# Patient Record
Sex: MALE | Race: WHITE | Employment: OTHER | URBAN - METROPOLITAN AREA
[De-identification: names, ages, dates, MRNs, and addresses within clinical notes are randomized per-mention and may not be internally consistent; named-entity substitution may affect disease eponyms.]

---

## 2019-10-30 ENCOUNTER — HOSPITAL ENCOUNTER (EMERGENCY)
Age: 64
Discharge: HOME | End: 2019-10-30
Attending: EMERGENCY MEDICINE | Admitting: EMERGENCY MEDICINE
Payer: SELF-PAY

## 2019-10-30 VITALS
TEMPERATURE: 98.7 F | RESPIRATION RATE: 16 BRPM | HEART RATE: 108 BPM | DIASTOLIC BLOOD PRESSURE: 93 MMHG | OXYGEN SATURATION: 97 % | SYSTOLIC BLOOD PRESSURE: 138 MMHG

## 2019-10-30 DIAGNOSIS — S01.01XA LACERATION OF SCALP, INITIAL ENCOUNTER: Primary | ICD-10-CM

## 2019-10-30 DIAGNOSIS — S21.111A LACERATION OF CHEST WALL, RIGHT, INITIAL ENCOUNTER: ICD-10-CM

## 2019-10-30 PROCEDURE — 74011000250 HC RX REV CODE- 250: Performed by: EMERGENCY MEDICINE

## 2019-10-30 PROCEDURE — 90715 TDAP VACCINE 7 YRS/> IM: CPT | Performed by: EMERGENCY MEDICINE

## 2019-10-30 PROCEDURE — 99283 EMERGENCY DEPT VISIT LOW MDM: CPT

## 2019-10-30 PROCEDURE — 74011250636 HC RX REV CODE- 250/636: Performed by: EMERGENCY MEDICINE

## 2019-10-30 PROCEDURE — 75810000294 HC INTERM/LAYERED WND RPR

## 2019-10-30 PROCEDURE — 75810000293 HC SIMP/SUPERF WND  RPR

## 2019-10-30 PROCEDURE — 96372 THER/PROPH/DIAG INJ SC/IM: CPT

## 2019-10-30 PROCEDURE — 90471 IMMUNIZATION ADMIN: CPT

## 2019-10-30 PROCEDURE — 74011000250 HC RX REV CODE- 250

## 2019-10-30 RX ORDER — LIDOCAINE HYDROCHLORIDE AND EPINEPHRINE 20; 10 MG/ML; UG/ML
15 INJECTION, SOLUTION INFILTRATION; PERINEURAL
Status: COMPLETED | OUTPATIENT
Start: 2019-10-30 | End: 2019-10-30

## 2019-10-30 RX ORDER — LIDOCAINE HYDROCHLORIDE AND EPINEPHRINE 20; 10 MG/ML; UG/ML
INJECTION, SOLUTION INFILTRATION; PERINEURAL
Status: COMPLETED
Start: 2019-10-30 | End: 2019-10-30

## 2019-10-30 RX ORDER — CEPHALEXIN 500 MG/1
500 CAPSULE ORAL 2 TIMES DAILY
Qty: 6 CAP | Refills: 0 | Status: SHIPPED | OUTPATIENT
Start: 2019-10-30 | End: 2019-11-02

## 2019-10-30 RX ADMIN — TETANUS TOXOID, REDUCED DIPHTHERIA TOXOID AND ACELLULAR PERTUSSIS VACCINE, ADSORBED 0.5 ML: 5; 2.5; 8; 8; 2.5 SUSPENSION INTRAMUSCULAR at 22:11

## 2019-10-30 RX ADMIN — LIDOCAINE HYDROCHLORIDE AND EPINEPHRINE 300 MG: 20; 10 INJECTION, SOLUTION INFILTRATION; PERINEURAL at 23:23

## 2019-10-30 RX ADMIN — CEFAZOLIN 1000 MG: 1 INJECTION, POWDER, FOR SOLUTION INTRAMUSCULAR; INTRAVENOUS; PARENTERAL at 23:24

## 2019-10-30 RX ADMIN — LIDOCAINE HYDROCHLORIDE,EPINEPHRINE BITARTRATE 300 MG: 20; .01 INJECTION, SOLUTION INFILTRATION; PERINEURAL at 23:23

## 2019-10-31 NOTE — DISCHARGE INSTRUCTIONS
Patient Education        Cuts Closed With Stitches: Care Instructions  Your Care Instructions  A cut can happen anywhere on your body. The doctor used stitches to close the cut. Using stitches also helps the cut heal and reduces scarring. Sometimes pieces of tape called Steri-Strips are put over the stitches. If the cut went deep and through the skin, the doctor may have put in two layers of stitches. The deeper layer brings the deep part of the cut together. These stitches will dissolve and don't need to be removed. The stitches in the upper layer are the ones you see on the cut. You will probably have a bandage over the stitches. You will need to have the stitches removed, usually in 7 to 14 days. The doctor has checked you carefully, but problems can develop later. If you notice any problems or new symptoms, get medical treatment right away. Follow-up care is a key part of your treatment and safety. Be sure to make and go to all appointments, and call your doctor if you are having problems. It's also a good idea to know your test results and keep a list of the medicines you take. How can you care for yourself at home? · Keep the cut dry for the first 24 to 48 hours. After this, you can shower if your doctor okays it. Pat the cut dry. · Don't soak the cut, such as in a bathtub. Your doctor will tell you when it's safe to get the cut wet. · If your doctor told you how to care for your cut, follow your doctor's instructions. If you did not get instructions, follow this general advice:  ? After the first 24 to 48 hours, wash around the cut with clean water 2 times a day. Don't use hydrogen peroxide or alcohol, which can slow healing. ? You may cover the cut with a thin layer of petroleum jelly, such as Vaseline, and a nonstick bandage. ? Apply more petroleum jelly and replace the bandage as needed. · Prop up the sore area on a pillow anytime you sit or lie down during the next 3 days.  Try to keep it above the level of your heart. This will help reduce swelling. · Avoid any activity that could cause your cut to reopen. · Do not remove the stitches on your own. Your doctor will tell you when to come back to have the stitches removed. · Leave Steri-Strips on until they fall off. · Be safe with medicines. Read and follow all instructions on the label. ? If the doctor gave you a prescription medicine for pain, take it as prescribed. ? If you are not taking a prescription pain medicine, ask your doctor if you can take an over-the-counter medicine. When should you call for help? Call your doctor now or seek immediate medical care if:    · You have new pain, or your pain gets worse.     · The skin near the cut is cold or pale or changes color.     · You have tingling, weakness, or numbness near the cut.     · The cut starts to bleed, and blood soaks through the bandage. Oozing small amounts of blood is normal.     · You have trouble moving the area near the cut.     · You have symptoms of infection, such as:  ? Increased pain, swelling, warmth, or redness around the cut.  ? Red streaks leading from the cut.  ? Pus draining from the cut.  ? A fever.    Watch closely for changes in your health, and be sure to contact your doctor if:    · The cut reopens.     · You do not get better as expected. Where can you learn more? Go to http://ledy-dejuan.info/. Enter R217 in the search box to learn more about \"Cuts Closed With Stitches: Care Instructions. \"  Current as of: June 26, 2019  Content Version: 12.2  © 5169-0549 [employer]. Care instructions adapted under license by [employer] (which disclaims liability or warranty for this information). If you have questions about a medical condition or this instruction, always ask your healthcare professional. [address] any warranty or liability for your use of this information.

## 2019-10-31 NOTE — ED NOTES
Patient provided with discharge instruction and reviewed suture care instructions provided. Patient ambulated independently with a steady gait with family member. Suture sites were intact and bandaged.  Patient was A&O x4

## 2019-10-31 NOTE — ED NOTES
Arrived to ED from home with family. Patient stated he tripped over rug while holding a glass and fell on the glass while drinking  ETOH. Lacerations about 3 inches on the right chest and mid anterior on head.  Denies numbness or tingling in right arm

## 2019-11-08 ENCOUNTER — HOSPITAL ENCOUNTER (EMERGENCY)
Age: 64
Discharge: HOME | End: 2019-11-08
Attending: EMERGENCY MEDICINE | Admitting: EMERGENCY MEDICINE
Payer: MEDICARE

## 2019-11-08 VITALS
DIASTOLIC BLOOD PRESSURE: 88 MMHG | TEMPERATURE: 98.1 F | OXYGEN SATURATION: 99 % | RESPIRATION RATE: 14 BRPM | HEART RATE: 95 BPM | SYSTOLIC BLOOD PRESSURE: 160 MMHG

## 2019-11-08 DIAGNOSIS — Z48.02 VISIT FOR SUTURE REMOVAL: Primary | ICD-10-CM

## 2019-11-08 PROCEDURE — 75810000275 HC EMERGENCY DEPT VISIT NO LEVEL OF CARE

## 2019-11-08 PROCEDURE — 74011000250 HC RX REV CODE- 250: Performed by: EMERGENCY MEDICINE

## 2019-11-08 RX ORDER — BACITRACIN 500 UNIT/G
1 PACKET (EA) TOPICAL ONCE
Status: COMPLETED | OUTPATIENT
Start: 2019-11-08 | End: 2019-11-08

## 2019-11-08 RX ADMIN — BACITRACIN 1 PACKET: 500 OINTMENT TOPICAL at 12:16

## 2019-11-08 NOTE — DISCHARGE INSTRUCTIONS
Patient Education        Learning About Stitches and Staples Removal  When are stitches and staples removed? Your doctor will tell you when to have your stitches or staples removed, usually in 7 to 14 days. How long you'll be told to wait will depend on things like where the wound is located, how big and how deep the wound is, and what your general health is like. Do not remove the stitches on your own. Stitches on the face are usually removed within a week. But stitches and staples on other areas of the body, such as on the back or belly or over a joint, may need to stay in place longer, often a week or two. Be sure to follow your doctor's instructions. How are stitches and staples removed? It usually doesn't hurt when the doctor removes the stitches or staples. You may feel a tug as each stitch or staple is removed. · You will either be seated or lying down. · To remove stitches, the doctor will use scissors to cut each of the knots and then pull the threads out. · To remove staples, the doctor will use a tool to take out the staples one at a time. · The area may still feel tender after the stitches or staples are gone. But it should feel better within a few minutes or up to a few hours. What can you expect after stitches and staples are removed? Depending on the type and location of the cut, you will have a scar. Scars usually fade over time. Keep the area clean, but you won't need a bandage. When should you call for help? Call your doctor now or seek immediate medical care if :  · You have new pain, or your pain gets worse. · You have trouble moving the area near the scar. · You have symptoms of infection, such as:  ? Increased pain, swelling, warmth, or redness around the scar. ? Red streaks leading from the scar. ? Pus draining from the scar. ? A fever. Watch closely for changes in your health, and be sure to contact your doctor if:  · The scar opens.   · You do not get better as expected. Follow-up care is a key part of your treatment and safety. Be sure to make and go to all appointments, and call your doctor if you do not get better as expected. It's also a good idea to keep a list of the medicines you take. Where can you learn more? Go to http://ledy-dejuan.info/. Enter X286 in the search box to learn more about \"Learning About Stitches and Staples Removal.\"  Current as of: June 26, 2019  Content Version: 12.2  © 6251-9141 [employer]. Care instructions adapted under license by [employer] (which disclaims liability or warranty for this information). If you have questions about a medical condition or this instruction, always ask your healthcare professional. [address] any warranty or liability for your use of this information.

## 2019-11-08 NOTE — ED PROVIDER NOTES
HPI patient is a 26-year-old white male who presents to the emergency room for suture removal of stitches that were placed 8 days ago in the right chest wall area and scalp. The sutures in the scalp are Vicryl and will dissolve. The skin is well approximated and appears to be well healing well. The chest wall incision has nylon stitches and  also appears to be well approximated ;healing well. We will remove the sutures today. He has not had any pain medications or other medications today prior to arrival.  History reviewed. No pertinent past medical history. History reviewed. No pertinent surgical history. History reviewed. No pertinent family history.     Social History     Socioeconomic History    Marital status: SINGLE     Spouse name: Not on file    Number of children: Not on file    Years of education: Not on file    Highest education level: Not on file   Occupational History    Not on file   Social Needs    Financial resource strain: Not on file    Food insecurity:     Worry: Not on file     Inability: Not on file    Transportation needs:     Medical: Not on file     Non-medical: Not on file   Tobacco Use    Smoking status: Not on file   Substance and Sexual Activity    Alcohol use: Not on file    Drug use: Not on file    Sexual activity: Not on file   Lifestyle    Physical activity:     Days per week: Not on file     Minutes per session: Not on file    Stress: Not on file   Relationships    Social connections:     Talks on phone: Not on file     Gets together: Not on file     Attends Restoration service: Not on file     Active member of club or organization: Not on file     Attends meetings of clubs or organizations: Not on file     Relationship status: Not on file    Intimate partner violence:     Fear of current or ex partner: Not on file     Emotionally abused: Not on file     Physically abused: Not on file     Forced sexual activity: Not on file   Other Topics Concern    Not on file   Social History Narrative    Not on file         ALLERGIES: Patient has no known allergies. Review of Systems   Constitutional: Negative for activity change, appetite change, fatigue, fever and unexpected weight change. HENT: Negative for congestion and trouble swallowing. Eyes: Negative for visual disturbance. Respiratory: Negative for cough and shortness of breath. Cardiovascular: Negative for chest pain, palpitations and leg swelling. Gastrointestinal: Negative for abdominal pain. Genitourinary: Negative for dysuria. Musculoskeletal: Negative for arthralgias, back pain, joint swelling and neck pain. Skin: Positive for wound. Neurological: Negative for dizziness and headaches. All other systems reviewed and are negative. Vitals:    11/08/19 1156   BP: 160/88   Pulse: 95   Resp: 14   Temp: 98.1 °F (36.7 °C)   SpO2: 99%            Physical Exam   Constitutional:   Elderly white male; former smoker   HENT:   Right Ear: External ear normal.   Left Ear: External ear normal.   Mouth/Throat: Oropharynx is clear and moist.   Vicryl sutures intact to the frontal scalp (will dissolve); There is no obvious bony deformity. Good neurovascular sensation. Neck: Normal range of motion. Neck supple. Cardiovascular: Normal rate and regular rhythm. Pulmonary/Chest: Effort normal and breath sounds normal.   Nylon sutures intact to the right anterior chest; skin appears well approximated; good neurovascular sensation   Abdominal: Soft. Bowel sounds are normal.   Musculoskeletal: Normal range of motion. Lymphadenopathy:     He has no cervical adenopathy. Neurological: He is alert. Skin: Skin is warm and dry. Nursing note and vitals reviewed. MDM       Procedures      Sutures were removed by me from the right anterior chest without difficulty, the area was cleansed with Shur-Clens wound care wash; a small amount of bacitracin and nonadhesive dressing were applied by the RN. Good neurovascular sensation before and after the procedure. Patient was discharged with instructions and wound care precautions.  Shirlene Lee NP

## 2019-11-08 NOTE — ED TRIAGE NOTES
Patient presents from home with complaints of needing sutures removed from his chest and head.  Patient fell last Friday and had sutures placed here

## 2019-11-08 NOTE — ED NOTES
I have reviewed discharge instructions with the patient. The patient verbalized understanding.     Bacitracin and dressing applied to chest by RN

## 2021-08-26 ENCOUNTER — HOSPITAL ENCOUNTER (EMERGENCY)
Age: 66
Discharge: HOME | End: 2021-08-26
Attending: STUDENT IN AN ORGANIZED HEALTH CARE EDUCATION/TRAINING PROGRAM | Admitting: STUDENT IN AN ORGANIZED HEALTH CARE EDUCATION/TRAINING PROGRAM
Payer: MEDICARE

## 2021-08-26 VITALS
RESPIRATION RATE: 18 BRPM | OXYGEN SATURATION: 99 % | SYSTOLIC BLOOD PRESSURE: 126 MMHG | TEMPERATURE: 98.1 F | DIASTOLIC BLOOD PRESSURE: 87 MMHG | BODY MASS INDEX: 21.56 KG/M2 | HEART RATE: 113 BPM | HEIGHT: 74 IN | WEIGHT: 168 LBS

## 2021-08-26 DIAGNOSIS — K61.0 PERIANAL ABSCESS: Primary | ICD-10-CM

## 2021-08-26 PROBLEM — K61.1 PERIRECTAL ABSCESS: Status: ACTIVE | Noted: 2021-08-26

## 2021-08-26 PROCEDURE — 99281 EMR DPT VST MAYX REQ PHY/QHP: CPT

## 2021-08-26 PROCEDURE — 74011000250 HC RX REV CODE- 250: Performed by: STUDENT IN AN ORGANIZED HEALTH CARE EDUCATION/TRAINING PROGRAM

## 2021-08-26 PROCEDURE — 75810000115

## 2021-08-26 RX ORDER — CLINDAMYCIN HYDROCHLORIDE 300 MG/1
300 CAPSULE ORAL 4 TIMES DAILY
Qty: 28 CAPSULE | Refills: 0 | Status: SHIPPED | OUTPATIENT
Start: 2021-08-26 | End: 2021-09-02

## 2021-08-26 RX ORDER — LIDOCAINE HYDROCHLORIDE AND EPINEPHRINE 10; 10 MG/ML; UG/ML
1.5 INJECTION, SOLUTION INFILTRATION; PERINEURAL
Status: DISCONTINUED | OUTPATIENT
Start: 2021-08-26 | End: 2021-08-26

## 2021-08-26 RX ORDER — LIDOCAINE HYDROCHLORIDE AND EPINEPHRINE 20; 10 MG/ML; UG/ML
1.5 INJECTION, SOLUTION INFILTRATION; PERINEURAL ONCE
Status: COMPLETED | OUTPATIENT
Start: 2021-08-26 | End: 2021-08-26

## 2021-08-26 RX ADMIN — LIDOCAINE HYDROCHLORIDE AND EPINEPHRINE 30 MG: 20; 10 INJECTION, SOLUTION INFILTRATION; PERINEURAL at 13:17

## 2021-08-26 NOTE — ED PROVIDER NOTES
Patient is a 39-year-old male presenting today with an anal mass. Patient reports that this started 3 days ago and is getting progressively worse. Patient has severe pain with sitting or walking. Similar to when he had an abscess in the past.  He tried witches aurora without improvement. Denies drainage or fever. Last time he had this it had to be drained. History reviewed. No pertinent past medical history. Past Surgical History:   Procedure Laterality Date    HX ORTHOPAEDIC      meniscus surgery         History reviewed. No pertinent family history. Social History     Socioeconomic History    Marital status: SINGLE     Spouse name: Not on file    Number of children: Not on file    Years of education: Not on file    Highest education level: Not on file   Occupational History    Not on file   Tobacco Use    Smoking status: Never Smoker    Smokeless tobacco: Never Used   Substance and Sexual Activity    Alcohol use: Yes     Alcohol/week: 42.0 standard drinks     Types: 42 Cans of beer per week    Drug use: Yes     Types: Marijuana    Sexual activity: Not on file   Other Topics Concern    Not on file   Social History Narrative    Not on file     Social Determinants of Health     Financial Resource Strain:     Difficulty of Paying Living Expenses:    Food Insecurity:     Worried About Running Out of Food in the Last Year:     [address] in the Last Year:    Transportation Needs:     Lack of Transportation (Medical):      Lack of Transportation (Non-Medical):    Physical Activity:     Days of Exercise per Week:     Minutes of Exercise per Session:    Stress:     Feeling of Stress :    Social Connections:     Frequency of Communication with Friends and Family:     Frequency of Social Gatherings with Friends and Family:     Attends [employer]ism Services:     Active Member of Clubs or Organizations:     Attends Club or Organization Meetings:     Marital Status:    Intimate Partner Violence:     Fear of Current or Ex-Partner:     Emotionally Abused:     Physically Abused:     Sexually Abused: ALLERGIES: Patient has no known allergies. Review of Systems   Constitutional: Negative for chills and fever. HENT: Negative for congestion and sore throat. Eyes: Negative for pain and redness. Respiratory: Negative for cough and shortness of breath. Cardiovascular: Negative for chest pain and palpitations. Gastrointestinal: Positive for anal bleeding. Negative for abdominal pain, blood in stool, diarrhea, nausea and vomiting. Genitourinary: Negative for frequency and hematuria. Musculoskeletal: Negative for back pain and neck pain. Skin: Negative for rash and wound. Neurological: Negative for dizziness and headaches. Hematological: Does not bruise/bleed easily. Vitals:    08/26/21 1029   BP: 126/87   Pulse: (!) 113   Resp: 18   Temp: 98.1 °F (36.7 °C)   SpO2: 99%   Weight: 76.2 kg (168 lb)   Height: 6' 2\" (1.88 m)            Physical Exam  Constitutional:       General: He is not in acute distress. Appearance: He is well-developed. He is not ill-appearing. HENT:      Head: Normocephalic. Eyes:      Conjunctiva/sclera: Conjunctivae normal.   Pulmonary:      Effort: Pulmonary effort is normal. No respiratory distress. Genitourinary:     Comments: There is a 1.5 cm tender fluctuant mass at the 6 o'clock position of the anus with slight tracking posteriorly of induration  Musculoskeletal:         General: Normal range of motion. Cervical back: Normal range of motion. Skin:     General: Skin is warm and dry. Capillary Refill: Capillary refill takes less than 2 seconds. Neurological:      Mental Status: He is alert and oriented to person, place, and time. Psychiatric:         Behavior: Behavior normal.          MDM       Patient seen by Dr. Krystina Plasencia. [id number] [address] for ED I+D. Will see in follow up. Recommends abx.  He offered pt OR but pt declined. Procedure Note - Incision and Drainage:   Performed by Nicole Akers DO . Consent was obtained. Immediately prior to the procedure, the patient was reevaluated and found suitable for the planned procedure and any planned medications. Immediately prior to the procedure a time out was called to verify the correct patient, procedure, equipment, staff, and marking as appropriate. The site prepped with Betadine and Sterile draping. Anesthesia was obtained via local infiltration of 5 mL lidocaine 2% with epinephrine   A 0.5 cm incision was made using a #11 scalpel blade to incise the abscess cavity. Moderate amount of purulent drainage was expressed. A packing was not placed. The procedure was tolerated well.        76 y.o. male presenting today with perianal abscess. I+D performed by me after seen by [facility]. Patient tolerated well. Afebrile. Doesn't appear systemically ill or toxic. Will start on clinda with close follow up. Return precautions provided. ICD-10-CM ICD-9-CM    1.  Perianal abscess  K61.0 5      GEMINI Palmer DO

## 2021-08-26 NOTE — ED NOTES
TRIAGE: Pt arrives from [facility] with c/o an anal abscess since Monday. 10/10 pain in anus. Pt reports he has had it done years ago. Denies fevers or other symptoms.

## 2021-08-26 NOTE — CONSULTS
Colorectal Surgery Consultation Note          NAME:  Kourtney Santana   :   1955   MRN:   [id number]     Referring Physician:  Lillian Ochoa DO    Consultation Date: 2021    Chief Complaint:  Anal mass     History of Present Illness: The patient is a 59-year-old male without a primary physician who presented to the ER this morning (sent from [facility]) with a 3-day history of progressively worsening pain and swelling int he anal area. He saw a small amount of blood with a bowel this morning. There has not been any drainage. He has not been febrile. PMH:  Past Medical History:   Diagnosis Date    Alcohol abuse     COVID-19 vaccine administered     Ja & Johsnon dose received on 2021       PSH:  Past Surgical History:   Procedure Laterality Date    HX ORTHOPAEDIC      meniscus surgery    HX OTHER SURGICAL  circa     Incision and drainage pf perirectal abscess in an ER under local anesthesia.  HX OTHER SURGICAL      Mediastinal biopsy to rule out sarcoidosis       Home Medications:  None       Hospital Medications:  Current Facility-Administered Medications   Medication Dose Route Frequency    lidocaine-EPINEPHrine (XYLOCAINE) 1 %-1:100,000 injection 15 mg  1.5 mL IntraDERMal NOW    lidocaine-EPINEPHrine (XYLOCAINE) 2 %-1:100,000 injection 30 mg  1.5 mL IntraDERMal ONCE     No current outpatient medications on file. Allergies:  No Known Allergies    Family History:  History reviewed. No pertinent family history. Social History:  Social History     Tobacco Use    Smoking status: Never Smoker    Smokeless tobacco: Never Used   Substance Use Topics    Alcohol use:  Yes     Alcohol/week: 42.0 standard drinks     Types: 42 Cans of beer per week       Review of Systems:    Symptom Y/N Comments  Symptom Y/N Comments   Fever/Chills N   Chest Pain N    Cough N   Abdominal Pain N    Sputum N   Joint Pain     SOB/LOU N   Pruritis/Rash     Nausea/vomit N Tolerating PT/OT     Diarrhea N   Tolerating Diet Y    Constipation N   Other       Could NOT obtain due to:        Objective:     Patient Vitals for the past 8 hrs:   BP Temp Pulse Resp SpO2 Height Weight   08/26/21 1029 126/87 98.1 °F (36.7 °C) (!) 113 18 99 % 6' 2\" (1.88 m) 76.2 kg (168 lb)     No intake/output data recorded. No intake/output data recorded. PHYSICAL EXAMINATION:    GENERAL:  Somewhat uncomfortable. HEENT:  Anicteric. PERINEUM:  Tenderness, erythema, and fluctuance in the anterior quadrant beginning just outside of the anal verge. Internal examination was not performed. EXTREMITIES:  No edema. NEURO:  Alert and oriented. Data Review     No results for input(s): WBC, HGB, HCT, PLT, HGBEXT, HCTEXT, PLTEXT, HGBEXT, HCTEXT, PLTEXT in the last 72 hours. No results for input(s): NA, K, CL, CO2, BUN, CREA, GLU, PHOS, CA in the last 72 hours. No results for input(s): AP, TBIL, TP, ALB, GLOB, GGT, AML, LPSE in the last 72 hours. No lab exists for component: SGOT, GPT, AMYP, HLPSE  No results for input(s): INR, PTP, APTT, INREXT, INREXT in the last 72 hours. Assessment and Plan:      The patient appears to have a perirectal/perianal abscess. Incision and drainage is indicated, and we discussed performing that procedure in the ER versus in the operating room. If I were to do it in the OR, then it would need to be a few hours from now because I am about to begin previously scheduled surgery. The other option is for Dr. Perez Boogie to do it her in the ER. The patient understood and he has chosen to have the procedure done in the ER. He should call my office tomorrow morning to schedule a follow-up appointment with me for approximately one week from now.     Active Problems:    Perirectal abscess (8/26/2021)